# Patient Record
Sex: FEMALE | Race: WHITE | NOT HISPANIC OR LATINO | Employment: FULL TIME | URBAN - METROPOLITAN AREA
[De-identification: names, ages, dates, MRNs, and addresses within clinical notes are randomized per-mention and may not be internally consistent; named-entity substitution may affect disease eponyms.]

---

## 2021-05-06 ENCOUNTER — TELEPHONE (OUTPATIENT)
Dept: OBGYN CLINIC | Facility: CLINIC | Age: 61
End: 2021-05-06

## 2021-05-06 NOTE — TELEPHONE ENCOUNTER
Patient was last seen in 2012, but wants to be seen for a breast lump  Patient only wants [facility] which doesn't have an opening until July  Offered patient to come in sooner with another provider pt refused  Can we get her in sooner with Dr Raven Rolon?

## 2021-06-18 RX ORDER — ATORVASTATIN CALCIUM 10 MG/1
1 TABLET, FILM COATED ORAL
COMMUNITY
Start: 2021-04-15

## 2021-06-18 RX ORDER — HYDROCHLOROTHIAZIDE 12.5 MG/1
1 CAPSULE, GELATIN COATED ORAL DAILY
COMMUNITY
Start: 2021-04-15

## 2021-06-19 NOTE — PROGRESS NOTES
Assessment/Plan:    Encounter for gynecological examination (general) (routine) without abnormal findings  Last seen in 2012, no mammograms or Pap smears  Has noted some right breast tenderness, no lumps or nipple discharge  Normal breast exam  Mammo ordered  Normal pelvic exam, Pap smear done  Encouraged  consideration of genetic testing due to family Eli Cobian return to office if interested  Also encouraged PCP follow-up, has not seen her for years  TSH ordered for complaints of hot flashes and night sweats  Family history of colon cancer  Never had colonoscopy, stressed importance, # for provider given  Family history of ovarian cancer  25year old niece and possibly mother (may have been uterine )  Discussed [facility] MyRisk testing and the benefit of knowledge to increase surveillance and/or risk reduction surgery  Will consider and return to office if interested in specimen collection  Diagnoses and all orders for this visit:    Breast cancer screening by mammogram  -     Mammo screening bilateral w 3d & cad; Future    Screening for thyroid disorder  -     TSH, 3rd generation with Free T4 reflex; Future  -     TSH, 3rd generation with Free T4 reflex    Screening for malignant neoplasm of the cervix  -     Liquid-based pap, screening    Encounter for gynecological examination (general) (routine) without abnormal findings    Other orders  -     atorvastatin (LIPITOR) 10 mg tablet; Take 1 tablet by mouth daily at bedtime  -     hydrochlorothiazide (MICROZIDE) 12 5 mg capsule; Take 1 capsule by mouth daily  -     Ascorbic Acid (vitamin C) 1000 MG tablet; Take 1 tablet by mouth daily  -     Multiple Vitamins-Minerals (MULTI FOR HER 50+ PO); Take 1 tablet by mouth daily  -     lisinopril (ZESTRIL) 20 mg tablet; Take 1 tablet by mouth Every 12 hours          Subjective:      Patient ID: Josephine Lou is a 61 y o  female      Here for first visit since 2012, complains of some right breast tenderness  The following portions of the patient's history were reviewed and updated as appropriate:   She  has a past medical history of High cholesterol, Hypertension, and Uterine fibroid  She   Patient Active Problem List    Diagnosis Date Noted    Encounter for gynecological examination (general) (routine) without abnormal findings 06/21/2021    Family history of ovarian cancer 06/21/2021    Family history of colon cancer 06/21/2021     She  has a past surgical history that includes Tubal ligation and Other surgical history  Her family history includes Colon cancer in her mother; Diabetes in her mother; Heart attack in her father and mother; Ovarian cancer in her mother; Ovarian cancer (age of onset: 25) in her other  She  reports that she has never smoked  She has never used smokeless tobacco  She reports current alcohol use  She reports that she does not use drugs       Review of Systems  Rare pink spotting after coitus  No breast, bladder, bowel changes   No new persistent pain, bloating, early satiety or pelvic pressure    Objective:      /78 (BP Location: Left arm, Patient Position: Sitting, Cuff Size: Large)   Ht 5' 2 25" (1 581 m)   Wt 109 kg (239 lb 12 8 oz)   Breastfeeding No   BMI 43 51 kg/m²          Physical Exam  General appearance: no distress, pleasant  Neck: thyroid without nodules or thyromegaly, no palpable adenopathy  Lymph nodes: no palpable adenopathy  Breasts: no masses, nodes or skin changes  Abdomen: soft, non tender, no palpable masses  Pelvic exam: normal atrophic external genitalia, urethral meatus normal, vagina atrophic without lesions, cervix atrophic without lesions, bimanual limited due to habitus,  uterus small, non tender, no adnexal masses, non tender  Rectal exam: normal sphincter tone, no masses, RV confirms above

## 2021-06-21 ENCOUNTER — OFFICE VISIT (OUTPATIENT)
Dept: OBGYN CLINIC | Facility: CLINIC | Age: 61
End: 2021-06-21
Payer: COMMERCIAL

## 2021-06-21 VITALS
WEIGHT: 239.8 LBS | HEIGHT: 62 IN | BODY MASS INDEX: 44.13 KG/M2 | SYSTOLIC BLOOD PRESSURE: 160 MMHG | DIASTOLIC BLOOD PRESSURE: 78 MMHG

## 2021-06-21 DIAGNOSIS — Z12.4 SCREENING FOR MALIGNANT NEOPLASM OF THE CERVIX: ICD-10-CM

## 2021-06-21 DIAGNOSIS — Z12.31 BREAST CANCER SCREENING BY MAMMOGRAM: Primary | ICD-10-CM

## 2021-06-21 DIAGNOSIS — Z01.419 ENCOUNTER FOR GYNECOLOGICAL EXAMINATION (GENERAL) (ROUTINE) WITHOUT ABNORMAL FINDINGS: ICD-10-CM

## 2021-06-21 DIAGNOSIS — Z13.29 SCREENING FOR THYROID DISORDER: ICD-10-CM

## 2021-06-21 PROBLEM — Z80.41 FAMILY HISTORY OF OVARIAN CANCER: Status: ACTIVE | Noted: 2021-06-21

## 2021-06-21 PROBLEM — Z80.0 FAMILY HISTORY OF COLON CANCER: Status: ACTIVE | Noted: 2021-06-21

## 2021-06-21 PROCEDURE — G0476 HPV COMBO ASSAY CA SCREEN: HCPCS | Performed by: OBSTETRICS & GYNECOLOGY

## 2021-06-21 PROCEDURE — S0610 ANNUAL GYNECOLOGICAL EXAMINA: HCPCS | Performed by: OBSTETRICS & GYNECOLOGY

## 2021-06-21 PROCEDURE — G0145 SCR C/V CYTO,THINLAYER,RESCR: HCPCS | Performed by: OBSTETRICS & GYNECOLOGY

## 2021-06-21 RX ORDER — LISINOPRIL 20 MG/1
1 TABLET ORAL EVERY 12 HOURS
COMMUNITY

## 2021-06-21 RX ORDER — MULTIVIT WITH MINERALS/LUTEIN
1 TABLET ORAL DAILY
COMMUNITY

## 2021-06-21 NOTE — ASSESSMENT & PLAN NOTE
Last seen in 2012, no mammograms or Pap smears  Has noted some right breast tenderness, no lumps or nipple discharge  Normal breast exam  Mammo ordered  Normal pelvic exam, Pap smear done  Encouraged  consideration of genetic testing due to family Anniece Pat return to office if interested  Also encouraged PCP follow-up, has not seen her for years  TSH ordered for complaints of hot flashes and night sweats

## 2021-06-21 NOTE — ASSESSMENT & PLAN NOTE
25year old niece and possibly mother (may have been uterine )  Discussed [facility] MyRisk testing and the benefit of knowledge to increase surveillance and/or risk reduction surgery  Will consider and return to office if interested in specimen collection

## 2021-06-21 NOTE — PATIENT INSTRUCTIONS
Consider getting [facility] panel testing, call when interested for an appointment  Return to office in one year unless having any problems such as bleeding, new persistent pain, new progressive bloating, new problems eating (getting full to quickly) or new constant urinary pressure that does not resolve in one week    Call Dr Drew Mejias for routine exam    Call [facility] for colonoscopy

## 2021-06-21 NOTE — LETTER
June 21, 2021     Josue Phoenix, [address] [place] [address] [address] [address]    Patient: Marily Hale   YOB: 1960   Date of Visit: 6/21/2021       Dear Dr Russell Show: Thank you for referring Marily Hale to me for evaluation  Below are my notes for this consultation  If you have questions, please do not hesitate to call me  I look forward to following your patient along with you  Sincerely,        Cyndy Mays MD        CC: No Recipients  Cyndy Mays MD  6/21/2021  2:31 PM  Sign when Signing Visit  Assessment/Plan:    Encounter for gynecological examination (general) (routine) without abnormal findings  Last seen in 2012, no mammograms or Pap smears  Has noted some right breast tenderness, no lumps or nipple discharge  Normal breast exam  Mammo ordered  Normal pelvic exam, Pap smear done  Encouraged  consideration of genetic testing due to family Mortimer Vianey return to office if interested  Also encouraged PCP follow-up, has not seen her for years  TSH ordered for complaints of hot flashes and night sweats  Family history of colon cancer  Never had colonoscopy, stressed importance, # for provider given  Family history of ovarian cancer  25year old niece and possibly mother (may have been uterine )  Discussed [facility] MyRisk testing and the benefit of knowledge to increase surveillance and/or risk reduction surgery  Will consider and return to office if interested in specimen collection  Diagnoses and all orders for this visit:    Breast cancer screening by mammogram  -     Mammo screening bilateral w 3d & cad; Future    Screening for thyroid disorder  -     TSH, 3rd generation with Free T4 reflex;  Future  -     TSH, 3rd generation with Free T4 reflex    Screening for malignant neoplasm of the cervix  -     Liquid-based pap, screening    Encounter for gynecological examination (general) (routine) without abnormal findings    Other orders  -     atorvastatin (LIPITOR) 10 mg tablet; Take 1 tablet by mouth daily at bedtime  -     hydrochlorothiazide (MICROZIDE) 12 5 mg capsule; Take 1 capsule by mouth daily  -     Ascorbic Acid (vitamin C) 1000 MG tablet; Take 1 tablet by mouth daily  -     Multiple Vitamins-Minerals (MULTI FOR HER 50+ PO); Take 1 tablet by mouth daily  -     lisinopril (ZESTRIL) 20 mg tablet; Take 1 tablet by mouth Every 12 hours          Subjective:      Patient ID: Davonte Villatoro is a 61 y o  female  Here for first visit since 2012, complains of some right breast tenderness  The following portions of the patient's history were reviewed and updated as appropriate:   She  has a past medical history of High cholesterol, Hypertension, and Uterine fibroid  She   Patient Active Problem List    Diagnosis Date Noted    Encounter for gynecological examination (general) (routine) without abnormal findings 06/21/2021    Family history of ovarian cancer 06/21/2021    Family history of colon cancer 06/21/2021     She  has a past surgical history that includes Tubal ligation and Other surgical history  Her family history includes Colon cancer in her mother; Diabetes in her mother; Heart attack in her father and mother; Ovarian cancer in her mother; Ovarian cancer (age of onset: 25) in her other  She  reports that she has never smoked  She has never used smokeless tobacco  She reports current alcohol use  She reports that she does not use drugs       Review of Systems  Rare pink spotting after coitus  No breast, bladder, bowel changes   No new persistent pain, bloating, early satiety or pelvic pressure    Objective:      /78 (BP Location: Left arm, Patient Position: Sitting, Cuff Size: Large)   Ht 5' 2 25" (1 581 m)   Wt 109 kg (239 lb 12 8 oz)   Breastfeeding No   BMI 43 51 kg/m²          Physical Exam  General appearance: no distress, pleasant  Neck: thyroid without nodules or thyromegaly, no palpable adenopathy  Lymph nodes: no palpable adenopathy  Breasts: no masses, nodes or skin changes  Abdomen: soft, non tender, no palpable masses  Pelvic exam: normal atrophic external genitalia, urethral meatus normal, vagina atrophic without lesions, cervix atrophic without lesions, bimanual limited due to habitus,  uterus small, non tender, no adnexal masses, non tender  Rectal exam: normal sphincter tone, no masses, RV confirms above

## 2021-06-21 NOTE — LETTER
June 21, 2021     Benito Monaco, [address] [facility] [address] [place] [address]    Patient: Scottie Parham   YOB: 1960   Date of Visit: 6/21/2021       Dear Dr Viraj Alex: Thank you for referring Scottie Parham to me for evaluation  Below are my notes for this consultation  If you have questions, please do not hesitate to call me  I look forward to following your patient along with you  Sincerely,        Kevin Marino MD        CC: No Recipients  Kevin Marino MD  6/21/2021  2:31 PM  Sign when Signing Visit  Assessment/Plan:    Encounter for gynecological examination (general) (routine) without abnormal findings  Last seen in 2012, no mammograms or Pap smears  Has noted some right breast tenderness, no lumps or nipple discharge  Normal breast exam  Mammo ordered  Normal pelvic exam, Pap smear done  Encouraged  consideration of genetic testing due to family Haskel Moh return to office if interested  Also encouraged PCP follow-up, has not seen her for years  TSH ordered for complaints of hot flashes and night sweats  Family history of colon cancer  Never had colonoscopy, stressed importance, # for provider given  Family history of ovarian cancer  25year old niece and possibly mother (may have been uterine )  Discussed [facility] MyRisk testing and the benefit of knowledge to increase surveillance and/or risk reduction surgery  Will consider and return to office if interested in specimen collection  Diagnoses and all orders for this visit:    Breast cancer screening by mammogram  -     Mammo screening bilateral w 3d & cad; Future    Screening for thyroid disorder  -     TSH, 3rd generation with Free T4 reflex;  Future  -     TSH, 3rd generation with Free T4 reflex    Screening for malignant neoplasm of the cervix  -     Liquid-based pap, screening    Encounter for gynecological examination (general) (routine) without abnormal findings    Other orders  -     atorvastatin (LIPITOR) 10 mg tablet; Take 1 tablet by mouth daily at bedtime  -     hydrochlorothiazide (MICROZIDE) 12 5 mg capsule; Take 1 capsule by mouth daily  -     Ascorbic Acid (vitamin C) 1000 MG tablet; Take 1 tablet by mouth daily  -     Multiple Vitamins-Minerals (MULTI FOR HER 50+ PO); Take 1 tablet by mouth daily  -     lisinopril (ZESTRIL) 20 mg tablet; Take 1 tablet by mouth Every 12 hours          Subjective:      Patient ID: Elijah Daniels is a 61 y o  female  Here for first visit since 2012, complains of some right breast tenderness  The following portions of the patient's history were reviewed and updated as appropriate:   She  has a past medical history of High cholesterol, Hypertension, and Uterine fibroid  She   Patient Active Problem List    Diagnosis Date Noted    Encounter for gynecological examination (general) (routine) without abnormal findings 06/21/2021    Family history of ovarian cancer 06/21/2021    Family history of colon cancer 06/21/2021     She  has a past surgical history that includes Tubal ligation and Other surgical history  Her family history includes Colon cancer in her mother; Diabetes in her mother; Heart attack in her father and mother; Ovarian cancer in her mother; Ovarian cancer (age of onset: 25) in her other  She  reports that she has never smoked  She has never used smokeless tobacco  She reports current alcohol use  She reports that she does not use drugs       Review of Systems  Rare pink spotting after coitus  No breast, bladder, bowel changes   No new persistent pain, bloating, early satiety or pelvic pressure    Objective:      /78 (BP Location: Left arm, Patient Position: Sitting, Cuff Size: Large)   Ht 5' 2 25" (1 581 m)   Wt 109 kg (239 lb 12 8 oz)   Breastfeeding No   BMI 43 51 kg/m²          Physical Exam  General appearance: no distress, pleasant  Neck: thyroid without nodules or thyromegaly, no palpable adenopathy  Lymph nodes: no palpable adenopathy  Breasts: no masses, nodes or skin changes  Abdomen: soft, non tender, no palpable masses  Pelvic exam: normal atrophic external genitalia, urethral meatus normal, vagina atrophic without lesions, cervix atrophic without lesions, bimanual limited due to habitus,  uterus small, non tender, no adnexal masses, non tender  Rectal exam: normal sphincter tone, no masses, RV confirms above

## 2021-06-23 LAB
HPV HR 12 DNA CVX QL NAA+PROBE: NEGATIVE
HPV16 DNA CVX QL NAA+PROBE: NEGATIVE
HPV18 DNA CVX QL NAA+PROBE: NEGATIVE

## 2021-06-25 LAB
LAB AP GYN PRIMARY INTERPRETATION: NORMAL
Lab: NORMAL